# Patient Record
Sex: FEMALE | Race: WHITE | NOT HISPANIC OR LATINO | Employment: OTHER | URBAN - METROPOLITAN AREA
[De-identification: names, ages, dates, MRNs, and addresses within clinical notes are randomized per-mention and may not be internally consistent; named-entity substitution may affect disease eponyms.]

---

## 2024-01-23 PROBLEM — Z90.710 H/O: HYSTERECTOMY: Status: RESOLVED | Noted: 2024-01-23 | Resolved: 2024-01-23

## 2024-01-23 PROBLEM — N17.9 ACUTE RENAL FAILURE SYNDROME (CMS-HCC): Status: RESOLVED | Noted: 2024-01-23 | Resolved: 2024-01-23

## 2024-01-23 PROBLEM — E55.9 VITAMIN D DEFICIENCY: Status: ACTIVE | Noted: 2024-01-23

## 2024-01-23 PROBLEM — H66.002 ACUTE SUPPURATIVE OTITIS MEDIA OF LEFT EAR WITHOUT SPONTANEOUS RUPTURE OF TYMPANIC MEMBRANE: Status: RESOLVED | Noted: 2024-01-23 | Resolved: 2024-01-23

## 2024-01-23 PROBLEM — D64.9 ANEMIA: Status: ACTIVE | Noted: 2024-01-23

## 2024-01-23 PROBLEM — R19.7 DIARRHEA: Status: RESOLVED | Noted: 2024-01-23 | Resolved: 2024-01-23

## 2024-01-23 PROBLEM — R92.8 ABNORMAL MAMMOGRAM: Status: RESOLVED | Noted: 2024-01-23 | Resolved: 2024-01-23

## 2024-01-23 PROBLEM — E53.8 VITAMIN B12 DEFICIENCY: Status: ACTIVE | Noted: 2024-01-23

## 2024-01-23 PROBLEM — N18.9 CKD (CHRONIC KIDNEY DISEASE): Status: RESOLVED | Noted: 2024-01-23 | Resolved: 2024-01-23

## 2024-01-23 RX ORDER — ACETAMINOPHEN 325 MG/1
325 TABLET ORAL EVERY 6 HOURS PRN
COMMUNITY
Start: 2020-06-05

## 2024-01-23 RX ORDER — ERGOCALCIFEROL 1.25 MG/1
50000 CAPSULE ORAL
COMMUNITY
Start: 2016-12-01 | End: 2024-03-26 | Stop reason: ALTCHOICE

## 2024-01-23 RX ORDER — LOPERAMIDE HYDROCHLORIDE 2 MG/1
2 CAPSULE ORAL 4 TIMES DAILY PRN
COMMUNITY
Start: 2021-07-12

## 2024-01-23 RX ORDER — POTASSIUM CHLORIDE 20 MEQ/1
20 TABLET, EXTENDED RELEASE ORAL DAILY
COMMUNITY
Start: 2020-03-07

## 2024-01-23 RX ORDER — LANOLIN ALCOHOL/MO/W.PET/CERES
1000 CREAM (GRAM) TOPICAL DAILY
COMMUNITY
Start: 2020-09-17 | End: 2024-04-11 | Stop reason: WASHOUT

## 2024-01-23 RX ORDER — LANOLIN ALCOHOL/MO/W.PET/CERES
400 CREAM (GRAM) TOPICAL DAILY
COMMUNITY

## 2024-01-25 ENCOUNTER — APPOINTMENT (OUTPATIENT)
Dept: PRIMARY CARE | Facility: CLINIC | Age: 64
End: 2024-01-25
Payer: COMMERCIAL

## 2024-02-04 NOTE — PROGRESS NOTES
REASON FOR VISIT:  Crohn's disease    HPI:  Rissa Schmidt is a 63 y.o. female who presents for follow-up of Crohn's disease.  Last seen 11/2022.   Longstanding Crohn's disease with remote ileocolonic resection in 1995. She was previously on 6-MP but stopped in 2008. She had been on Remicade previously but had infections with this so it was stopped.      Presented acutely mid-11/2019 with acute abdomen and went to OR for ileal anastomotic obstruction ([facility]). Post-op course complicated by suspected leak, with delayed/recurring abscess formation, and need for drains.     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     3/26/2020 went back to OR for re-do ileocolic resection and loop ileostomy. Had difficulty with high output loop ileostomy, dehydration, and SIM requiring IVF.      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 Splenic artery not seen. High-grade 1 cm stenosis of the origin of the SMA. Mild stenosis of the infrarenal abdominal aorta just proximal to the bifurcation and short segment focal dissection of the right common iliac artery.     8/2021 stopped warfarin after visit with Dr. Zamudio. Recommended to be on ASA and statin.     Last seen 11/2022. Stools variable depending what eats. Stools 4-6 daily. Stool very loose (first AM stool) and then watery the rest of the day. For about 1 month noted worsened acid reflux. Also with pain in LUQ at site of stoma closure. No N/V.    11/2022 CT abd-pelvis OK without any obvious disease.  Was supposed to do FCP  "and colonoscopy, but did not.   w/ bladder CA.      In follow-up today, patient overall feels well. No abd pain, 6-10x liquid stools daily, no blood in stool, occasional urgency with BM, occasional nocturnal symptoms.  had bladder removal and now in remission, very stressful past 1.5 years and now goods. Still smoking 1/2 P daily. Patient self discontinued ASA/statin due to \"intolerance\".  In the office today, she says that she would consider advanced therapies.  Of note, when her stool gets to form, she has increased abdominal pain.  She prefers to have loose stool, though would be happy if the frequency were less.    EIMs: No joint pain/eye pain/eye redness, new skin lesion on R hand pending derm eval, no oral lesion.     REVIEW OF SYSTEMS: All 10 systems reviewed are negative unless mentioned in HPI.     Allergies   Allergen Reactions    Oxycodone Hallucinations    Latex, Natural Rubber Rash and Unknown       Past Medical History:   Diagnosis Date    Abnormal mammogram 01/23/2024    Acute renal failure syndrome (CMS/HCC) 01/23/2024    Acute suppurative otitis media of left ear without spontaneous rupture of tympanic membrane 01/23/2024    Anemia     Arthritis     Carpal tunnel syndrome     CKD (chronic kidney disease) 01/23/2024    Crohn's disease of both small and large intestine with abscess (CMS/HCC) 02/27/2021    Crohn's disease of both small and large intestine with abscess    Diarrhea 01/23/2024    Difficulty in walking     Erythema nodosum     H/O: hysterectomy 01/23/2024    Hypokalemia     Hypomagnesemia     Leukocytosis     Ophthalmic migraine     Radiographic dye allergy status     Allergic to IV contrast    Skin lesion     Status post ileostomy (CMS/HCC)     Tendonitis     Vitamin B12 deficiency     Vitamin D deficiency        Past Surgical History:   Procedure Laterality Date    APPENDECTOMY  11/10/2016    Appendectomy    CT GUIDED PERCUTANEOUS PERITONEAL OR RETROPERITONEAL FLUID " "COLLECTION DRAINAGE  12/9/2019    CT GUIDED PERCUTANEOUS PERITONEAL OR RETROPERITONEAL FLUID COLLECTION DRAINAGE [facility]    GALLBLADDER SURGERY  11/10/2016    Gallbladder Surgery    HYSTERECTOMY  11/10/2016    Hysterectomy    OTHER SURGICAL HISTORY  11/10/2016    Thoracotomy    OTHER SURGICAL HISTORY  06/17/2020    Ileostomy closure    OTHER SURGICAL HISTORY  01/02/2020    Cholecystectomy    OTHER SURGICAL HISTORY  01/02/2020    Hysterectomy    SMALL INTESTINE SURGERY  11/10/2016    Small Bowel Resection    US GUIDED PERCUTANEOUS PERITONEAL OR RETROPERITONEAL FLUID COLLECTION DRAINAGE  12/3/2019    US GUIDED PERCUTANEOUS PERITONEAL OR RETROPERITONEAL FLUID COLLECTION DRAINAGE [facility]    US GUIDED PERCUTANEOUS PERITONEAL OR RETROPERITONEAL FLUID COLLECTION DRAINAGE  12/3/2019    US GUIDED PERCUTANEOUS PERITONEAL OR RETROPERITONEAL FLUID COLLECTION DRAINAGE [facility]       Current Outpatient Medications   Medication Sig Dispense Refill    acetaminophen (Tylenol) 325 mg tablet Take 1 tablet (325 mg) by mouth every 6 hours if needed for mild pain (1 - 3) or moderate pain (4 - 6).      cyanocobalamin (Vitamin B-12) 1,000 mcg tablet Take 1 tablet (1,000 mcg) by mouth once daily.      ergocalciferol (Vitamin D2) 1.25 MG (50119 UT) capsule Take 1 capsule (50,000 Units) by mouth 1 (one) time per week.      loperamide (Imodium) 2 mg capsule Take 1 capsule (2 mg) by mouth 4 times a day as needed for diarrhea.      magnesium oxide (Mag-Ox) 400 mg (241.3 mg magnesium) tablet Take 1 tablet (400 mg) by mouth once daily.      potassium chloride CR 20 mEq ER tablet Take 1 tablet (20 mEq) by mouth once daily.       No current facility-administered medications for this visit.       PHYSICAL EXAM:  /60   Pulse 85   Temp 36.6 °C (97.9 °F)   Ht 1.676 m (5' 6\")   Wt 80.7 kg (178 lb)   SpO2 98%   BMI 28.73 kg/m²      GEN: NAD   HEENT: No Scleral icterus/oral ulcer   CV: RRR  PULM: LCTA "   ABD: Soft, NT, ND, +BS  EXT: No LE edema  SKIN: Small scaly skin lesion on R dorsal hand     Lab Results   Component Value Date    WBC 13.8 (H) 11/10/2022    HGB 13.2 11/10/2022    HCT 40.1 11/10/2022    MCV 97 11/10/2022     11/10/2022     Lab Results   Component Value Date    ALT 15 11/10/2022    AST 19 11/10/2022    GGT 58 (H) 12/08/2019    ALKPHOS 107 11/10/2022    BILITOT 0.3 11/10/2022       === 11/28/22 ===    CT ABDOMEN AND PELVIS W ORAL CONTRAST ONLY    - Impression -  1.  No acute intra-abdominal process. Anastomosis and adjacent bowel  loops appear normal and unchanged from 2021. Mild bilateral  pelvicaliectasis slightly increased from prior examination in the  presence of a distended urinary bladder.      ASSESSMENT  # Crohn's disease -overall, disease remains clinically stable.  She has not had disease reevaluation endoscopically since her surgery.  She spent the past year caring for her  who has bladder cancer, but is currently doing well.  She is interested in reevaluation and treatment if needed.    It is unclear to me whether she has any progressive disease.  Will check labs and stool calprotectin.  Will plan colonoscopy.  Based on these results, we can initiate therapy if needed.  In the past, she has gone decades between her surgeries, so she may have slowly progressive/recurrent disease.    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  I recommended that she go back on aspirin 81 mg daily which she is willing to do.  She was warned about signs of mesenteric ischemia, but fortunately at this time she has none of these.      PLAN  1) check labs  2) check stool fecal calprotectin  3) schedule colonoscopy  4) based on the results of the above testing, we will discuss medical management for any active Crohn's disease  5) call the office with any worsening symptoms; you can always contact my nurse, Diana Earl RN at [phone] with any questions  6)  I recommend you consider restarting a baby aspirin at 81 mg once daily

## 2024-02-06 ENCOUNTER — OFFICE VISIT (OUTPATIENT)
Dept: GASTROENTEROLOGY | Facility: HOSPITAL | Age: 64
End: 2024-02-06
Payer: COMMERCIAL

## 2024-02-06 VITALS
DIASTOLIC BLOOD PRESSURE: 60 MMHG | BODY MASS INDEX: 28.61 KG/M2 | HEIGHT: 66 IN | WEIGHT: 178 LBS | SYSTOLIC BLOOD PRESSURE: 116 MMHG | TEMPERATURE: 97.9 F | HEART RATE: 85 BPM | OXYGEN SATURATION: 98 %

## 2024-02-06 DIAGNOSIS — R10.30 LOWER ABDOMINAL PAIN: ICD-10-CM

## 2024-02-06 DIAGNOSIS — K50.819 CROHN'S DISEASE OF SMALL AND LARGE INTESTINES WITH COMPLICATION (MULTI): Primary | ICD-10-CM

## 2024-02-06 PROCEDURE — 99214 OFFICE O/P EST MOD 30 MIN: CPT | Performed by: INTERNAL MEDICINE

## 2024-02-06 RX ORDER — DIPHENOXYLATE HYDROCHLORIDE AND ATROPINE SULFATE 2.5; .025 MG/1; MG/1
2 TABLET ORAL 4 TIMES DAILY PRN
Qty: 56 TABLET | Refills: 0 | Status: SHIPPED | OUTPATIENT
Start: 2024-02-06 | End: 2024-02-13

## 2024-02-06 ASSESSMENT — PAIN SCALES - GENERAL: PAINLEVEL: 0-NO PAIN

## 2024-02-06 NOTE — PATIENT INSTRUCTIONS
As we discussed today, we will do some evaluation to assess your Crohn's disease activity.  If there is active disease, then we will discuss medical management to prevent disease progression.  As we reviewed today:    1) check labs  2) check stool fecal calprotectin  3) schedule colonoscopy  4) based on the results of the above testing, we will discuss medical management for any active Crohn's disease  5) call the office with any worsening symptoms; you can always contact my nurse, Diana Earl RN at [phone] with any questions  6) I recommend you consider restarting a baby aspirin at 81 mg once daily

## 2024-03-01 ENCOUNTER — LAB (OUTPATIENT)
Dept: LAB | Facility: LAB | Age: 64
End: 2024-03-01
Payer: COMMERCIAL

## 2024-03-01 DIAGNOSIS — R10.30 LOWER ABDOMINAL PAIN: ICD-10-CM

## 2024-03-01 DIAGNOSIS — K50.819 CROHN'S DISEASE OF SMALL AND LARGE INTESTINES WITH COMPLICATION (MULTI): ICD-10-CM

## 2024-03-01 LAB
ALBUMIN SERPL-MCNC: 3.8 G/DL (ref 3.5–5)
ALP BLD-CCNC: 98 U/L (ref 35–125)
ALT SERPL-CCNC: 17 U/L (ref 5–40)
ANION GAP SERPL CALC-SCNC: 13 MMOL/L
AST SERPL-CCNC: 19 U/L (ref 5–40)
BASOPHILS # BLD AUTO: 0.07 X10*3/UL (ref 0–0.1)
BASOPHILS NFR BLD AUTO: 0.5 %
BILIRUB SERPL-MCNC: 0.2 MG/DL (ref 0.1–1.2)
BUN SERPL-MCNC: 15 MG/DL (ref 8–25)
CALCIUM SERPL-MCNC: 8.9 MG/DL (ref 8.5–10.4)
CHLORIDE SERPL-SCNC: 108 MMOL/L (ref 97–107)
CO2 SERPL-SCNC: 22 MMOL/L (ref 24–31)
CREAT SERPL-MCNC: 1.1 MG/DL (ref 0.4–1.6)
CRP SERPL-MCNC: 1 MG/DL (ref 0–2)
EGFRCR SERPLBLD CKD-EPI 2021: 57 ML/MIN/1.73M*2
EOSINOPHIL # BLD AUTO: 0.41 X10*3/UL (ref 0–0.7)
EOSINOPHIL NFR BLD AUTO: 3.1 %
ERYTHROCYTE [DISTWIDTH] IN BLOOD BY AUTOMATED COUNT: 13.3 % (ref 11.5–14.5)
GLUCOSE SERPL-MCNC: 101 MG/DL (ref 65–99)
HCT VFR BLD AUTO: 35.6 % (ref 36–46)
HGB BLD-MCNC: 11.7 G/DL (ref 12–16)
IMM GRANULOCYTES # BLD AUTO: 0.05 X10*3/UL (ref 0–0.7)
IMM GRANULOCYTES NFR BLD AUTO: 0.4 % (ref 0–0.9)
LYMPHOCYTES # BLD AUTO: 4.55 X10*3/UL (ref 1.2–4.8)
LYMPHOCYTES NFR BLD AUTO: 34.5 %
MCH RBC QN AUTO: 32.7 PG (ref 26–34)
MCHC RBC AUTO-ENTMCNC: 32.9 G/DL (ref 32–36)
MCV RBC AUTO: 99 FL (ref 80–100)
MONOCYTES # BLD AUTO: 1.04 X10*3/UL (ref 0.1–1)
MONOCYTES NFR BLD AUTO: 7.9 %
NEUTROPHILS # BLD AUTO: 7.05 X10*3/UL (ref 1.2–7.7)
NEUTROPHILS NFR BLD AUTO: 53.6 %
NRBC BLD-RTO: 0 /100 WBCS (ref 0–0)
PLATELET # BLD AUTO: 351 X10*3/UL (ref 150–450)
POTASSIUM SERPL-SCNC: 3.7 MMOL/L (ref 3.4–5.1)
PROT SERPL-MCNC: 6.5 G/DL (ref 5.9–7.9)
RBC # BLD AUTO: 3.58 X10*6/UL (ref 4–5.2)
SODIUM SERPL-SCNC: 143 MMOL/L (ref 133–145)
WBC # BLD AUTO: 13.2 X10*3/UL (ref 4.4–11.3)

## 2024-03-01 PROCEDURE — 83735 ASSAY OF MAGNESIUM: CPT

## 2024-03-01 PROCEDURE — 86481 TB AG RESPONSE T-CELL SUSP: CPT

## 2024-03-01 PROCEDURE — 85025 COMPLETE CBC W/AUTO DIFF WBC: CPT

## 2024-03-01 PROCEDURE — 86140 C-REACTIVE PROTEIN: CPT

## 2024-03-01 PROCEDURE — 36415 COLL VENOUS BLD VENIPUNCTURE: CPT

## 2024-03-01 PROCEDURE — 80053 COMPREHEN METABOLIC PANEL: CPT

## 2024-03-01 PROCEDURE — 83993 ASSAY FOR CALPROTECTIN FECAL: CPT

## 2024-03-03 LAB
NIL(NEG) CONTROL SPOT COUNT: NORMAL
PANEL A SPOT COUNT: 0
PANEL B SPOT COUNT: 0
POS CONTROL SPOT COUNT: NORMAL
T-SPOT. TB INTERPRETATION: NEGATIVE

## 2024-03-05 LAB — CALPROTECTIN STL-MCNT: 54 UG/G

## 2024-03-06 DIAGNOSIS — K50.819 CROHN'S DISEASE OF SMALL AND LARGE INTESTINES WITH COMPLICATION (MULTI): Primary | ICD-10-CM

## 2024-03-06 LAB — MAGNESIUM SERPL-MCNC: 1.8 MG/DL (ref 1.6–3.1)

## 2024-03-22 PROBLEM — E53.8 COBALAMIN DEFICIENCY: Status: RESOLVED | Noted: 2024-01-23 | Resolved: 2024-03-22

## 2024-03-22 PROBLEM — E87.6 CHRONIC HYPOKALEMIA: Status: RESOLVED | Noted: 2024-03-22 | Resolved: 2024-03-22

## 2024-03-22 PROBLEM — K21.9 GASTROESOPHAGEAL REFLUX DISEASE: Status: RESOLVED | Noted: 2024-03-22 | Resolved: 2024-03-22

## 2024-03-22 PROBLEM — K63.2 COLONIC FISTULA: Status: RESOLVED | Noted: 2024-03-22 | Resolved: 2024-03-22

## 2024-03-22 PROBLEM — S69.90XA INJURY OF WRIST: Status: RESOLVED | Noted: 2024-03-22 | Resolved: 2024-03-22

## 2024-03-22 PROBLEM — N28.0 RENAL INFARCTION (MULTI): Status: RESOLVED | Noted: 2024-03-22 | Resolved: 2024-03-22

## 2024-03-22 PROBLEM — I74.9 EMBOLISM AND THROMBOSIS OF ARTERY (MULTI): Status: RESOLVED | Noted: 2024-03-22 | Resolved: 2024-03-22

## 2024-03-22 PROBLEM — E75.6: Status: RESOLVED | Noted: 2024-03-22 | Resolved: 2024-03-22

## 2024-03-22 PROBLEM — S52.509A CLOSED FRACTURE OF DISTAL END OF RADIUS: Status: RESOLVED | Noted: 2024-03-22 | Resolved: 2024-03-22

## 2024-03-22 PROBLEM — S37.009A INJURY OF KIDNEY: Status: RESOLVED | Noted: 2024-03-22 | Resolved: 2024-03-22

## 2024-03-22 PROBLEM — R10.9 LEFT FLANK PAIN: Status: RESOLVED | Noted: 2024-02-06 | Resolved: 2024-03-22

## 2024-03-22 PROBLEM — Z90.710 HISTORY OF HYSTERECTOMY: Status: RESOLVED | Noted: 2024-03-22 | Resolved: 2024-03-22

## 2024-03-26 ENCOUNTER — OFFICE VISIT (OUTPATIENT)
Dept: PRIMARY CARE | Facility: CLINIC | Age: 64
End: 2024-03-26
Payer: COMMERCIAL

## 2024-03-26 VITALS
BODY MASS INDEX: 29.25 KG/M2 | SYSTOLIC BLOOD PRESSURE: 138 MMHG | OXYGEN SATURATION: 96 % | DIASTOLIC BLOOD PRESSURE: 74 MMHG | HEIGHT: 66 IN | WEIGHT: 182 LBS | HEART RATE: 77 BPM | TEMPERATURE: 98.1 F

## 2024-03-26 DIAGNOSIS — E53.8 VITAMIN B12 DEFICIENCY: ICD-10-CM

## 2024-03-26 DIAGNOSIS — Z13.220 SCREENING FOR CHOLESTEROL LEVEL: ICD-10-CM

## 2024-03-26 DIAGNOSIS — Z12.31 SCREENING MAMMOGRAM FOR BREAST CANCER: ICD-10-CM

## 2024-03-26 DIAGNOSIS — E55.9 VITAMIN D DEFICIENCY: ICD-10-CM

## 2024-03-26 DIAGNOSIS — Z00.00 ANNUAL PHYSICAL EXAM: Primary | ICD-10-CM

## 2024-03-26 PROCEDURE — 90715 TDAP VACCINE 7 YRS/> IM: CPT | Performed by: INTERNAL MEDICINE

## 2024-03-26 PROCEDURE — 4004F PT TOBACCO SCREEN RCVD TLK: CPT | Performed by: INTERNAL MEDICINE

## 2024-03-26 PROCEDURE — 99396 PREV VISIT EST AGE 40-64: CPT | Performed by: INTERNAL MEDICINE

## 2024-03-26 RX ORDER — VIT C/E/ZN/COPPR/LUTEIN/ZEAXAN 250MG-90MG
25 CAPSULE ORAL DAILY
COMMUNITY
End: 2024-04-11 | Stop reason: WASHOUT

## 2024-03-26 RX ORDER — ASPIRIN 81 MG/1
81 TABLET ORAL DAILY
COMMUNITY

## 2024-03-26 ASSESSMENT — ENCOUNTER SYMPTOMS
LOSS OF SENSATION IN FEET: 0
OCCASIONAL FEELINGS OF UNSTEADINESS: 0
DEPRESSION: 0

## 2024-03-26 ASSESSMENT — LIFESTYLE VARIABLES
HAVE YOU OR SOMEONE ELSE BEEN INJURED AS A RESULT OF YOUR DRINKING: NO
HOW OFTEN DURING THE LAST YEAR HAVE YOU NEEDED AN ALCOHOLIC DRINK FIRST THING IN THE MORNING TO GET YOURSELF GOING AFTER A NIGHT OF HEAVY DRINKING: NEVER
HAS A RELATIVE, FRIEND, DOCTOR, OR ANOTHER HEALTH PROFESSIONAL EXPRESSED CONCERN ABOUT YOUR DRINKING OR SUGGESTED YOU CUT DOWN: NO
SKIP TO QUESTIONS 9-10: 1
AUDIT TOTAL SCORE: 0
HOW OFTEN DURING THE LAST YEAR HAVE YOU FOUND THAT YOU WERE NOT ABLE TO STOP DRINKING ONCE YOU HAD STARTED: NEVER
HOW OFTEN DURING THE LAST YEAR HAVE YOU FAILED TO DO WHAT WAS NORMALLY EXPECTED FROM YOU BECAUSE OF DRINKING: NEVER
HOW MANY STANDARD DRINKS CONTAINING ALCOHOL DO YOU HAVE ON A TYPICAL DAY: PATIENT DOES NOT DRINK
AUDIT-C TOTAL SCORE: 0
HOW OFTEN DURING THE LAST YEAR HAVE YOU HAD A FEELING OF GUILT OR REMORSE AFTER DRINKING: NEVER
HOW OFTEN DURING THE LAST YEAR HAVE YOU BEEN UNABLE TO REMEMBER WHAT HAPPENED THE NIGHT BEFORE BECAUSE YOU HAD BEEN DRINKING: NEVER
HOW OFTEN DO YOU HAVE A DRINK CONTAINING ALCOHOL: NEVER
HOW OFTEN DO YOU HAVE SIX OR MORE DRINKS ON ONE OCCASION: NEVER

## 2024-03-26 ASSESSMENT — PATIENT HEALTH QUESTIONNAIRE - PHQ9
SUM OF ALL RESPONSES TO PHQ9 QUESTIONS 1 AND 2: 0
2. FEELING DOWN, DEPRESSED OR HOPELESS: NOT AT ALL
1. LITTLE INTEREST OR PLEASURE IN DOING THINGS: NOT AT ALL

## 2024-03-26 ASSESSMENT — PAIN SCALES - GENERAL: PAINLEVEL: 0-NO PAIN

## 2024-03-26 NOTE — PROGRESS NOTES
[facility]: [facility]  PROGRESS NOTE      Rissa Schmidt is a 63 y.o. female that is being seen  today for CPE.  Subjective   Pt. Is being seen for annual physical exam.Pt. has been doing well.Advance directives discussed with patient   Denies any hospitalisation or urgent care visit.  Previous Labs reviewed .  Pt. Is upto date on screening exams and vaccination  Denies any falls or hospitalisation.     Patient is a 63-year-old female with history of Crohn's disease who is being seen for annual physical examination.  Patient follows up with the GI physician and had multiple colonoscopies and endoscopies in the past.  Patient's symptoms have been fairly controlled now.  Patient is due for screening mammogram.  Patient is up-to-date on immunization.      ROS  Negative for fever or chills  Negative for sore throat, ear pain, nasal discharge  Negative for cough, shortness of breath or wheezing  Negative for chest pain, palpitations, swelling of legs  Negative for abdominal pain, constipation, positive for diarrhea, and some blood in the stools occasionally.  Patient has Crohn's disease  Negative for urinary complaints  Negative for headache, dizziness, weakness or numbness  Negative for joint pain  Negative for depression or anxiety  All other systems reviewed and were negative   Vitals:    03/26/24 1445   BP: 138/74   Pulse: 77   Temp: 36.7 °C (98.1 °F)   SpO2: 96%      Vitals:    03/26/24 1445   Weight: 82.6 kg (182 lb)     Body mass index is 29.38 kg/m².  Physical Exam  Constitutional: Patient does not appear to be in any acute distress  Head and Face: NCAT  Eyes: Normal external exam, EOMI  ENT: Normal external inspection of ears and nose. Oropharynx normal.  Cardiovascular: RRR, S1/S2, no murmurs, rubs, or gallops, radial pulses +2, no edema of extremities  Pulmonary: CTAB, no respiratory distress.  Abdomen: +BS, soft, non-tender, nondistended, no guarding or rebound, no masses  "noted  MSK: No joint swelling, normal movements of all extremities. Range of motion- normal.  Skin- No lesions, contusions, or erythema.  Peripheral puslses palpable bilaterally 2+  Neuro: AAO X3, Cranial nerves 2-12 grossly intact,DTR 2+ in all 4 limbs   Psychiatric: Judgment intact. Appropriate mood and behavior    LABS   [unfilled]  Lab Results   Component Value Date    GLUCOSE 101 (H) 03/01/2024    CALCIUM 8.9 03/01/2024     03/01/2024    K 3.7 03/01/2024    CO2 22 (L) 03/01/2024     (H) 03/01/2024    BUN 15 03/01/2024    CREATININE 1.10 03/01/2024     Lab Results   Component Value Date    ALT 17 03/01/2024    AST 19 03/01/2024    GGT 58 (H) 12/08/2019    ALKPHOS 98 03/01/2024    BILITOT 0.2 03/01/2024     Lab Results   Component Value Date    WBC 13.2 (H) 03/01/2024    HGB 11.7 (L) 03/01/2024    HCT 35.6 (L) 03/01/2024    MCV 99 03/01/2024     03/01/2024     No results found for: \"CHOL\"  No results found for: \"HDL\"  No results found for: \"LDLCALC\"  No results found for: \"TRIG\"  Lab Results   Component Value Date    HGBA1C 5.5 11/18/2019     Other labs not included in the list above were reviewed either before or during this encounter.    History    Past Medical History:   Diagnosis Date    Abnormal deposits of lipid 03/22/2024    Abnormal mammogram 01/23/2024    Acute renal failure syndrome (CMS/HCC) 01/23/2024    Acute suppurative otitis media of left ear without spontaneous rupture of tympanic membrane 01/23/2024    Anemia     Arthritis     Carpal tunnel syndrome     Chronic hypokalemia 03/22/2024    CKD (chronic kidney disease) 01/23/2024    Closed fracture of distal end of radius 03/22/2024    Cobalamin deficiency 01/23/2024    Colonic fistula 03/22/2024    Crohn's disease of both small and large intestine with abscess (CMS/HCC) 02/27/2021    Crohn's disease of both small and large intestine with abscess    Diarrhea 01/23/2024    Difficulty in walking     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/[facility]) 03/22/2024    Erythema nodosum     Gastroesophageal reflux disease 03/22/2024    H/O: hysterectomy 01/23/2024    History of hysterectomy 03/22/2024    Hypokalemia     Hypomagnesemia     Injury of kidney 03/22/2024    Injury of wrist 03/22/2024    Left flank pain 02/06/2024    Leukocytosis     Ophthalmic migraine     Radiographic dye allergy status     Allergic to IV contrast    Renal infarction (CMS/[facility]) 03/22/2024    Skin lesion     Status post ileostomy (CMS/[facility])     Tendonitis     Vitamin B12 deficiency     Vitamin D deficiency      Past Surgical History:   Procedure Laterality Date    APPENDECTOMY  11/10/2016    Appendectomy    CT GUIDED PERCUTANEOUS PERITONEAL OR RETROPERITONEAL FLUID COLLECTION DRAINAGE  12/9/2019    CT GUIDED PERCUTANEOUS PERITONEAL OR RETROPERITONEAL FLUID COLLECTION DRAINAGE [facility]    GALLBLADDER SURGERY  11/10/2016    Gallbladder Surgery    HYSTERECTOMY  11/10/2016    Hysterectomy    OTHER SURGICAL HISTORY  11/10/2016    Thoracotomy    OTHER SURGICAL HISTORY  06/17/2020    Ileostomy closure    OTHER SURGICAL HISTORY  01/02/2020    Cholecystectomy    OTHER SURGICAL HISTORY  01/02/2020    Hysterectomy    SMALL INTESTINE SURGERY  11/10/2016    Small Bowel Resection    US GUIDED PERCUTANEOUS PERITONEAL OR RETROPERITONEAL FLUID COLLECTION DRAINAGE  12/3/2019    US GUIDED PERCUTANEOUS PERITONEAL OR RETROPERITONEAL FLUID COLLECTION DRAINAGE [facility]    US GUIDED PERCUTANEOUS PERITONEAL OR RETROPERITONEAL FLUID COLLECTION DRAINAGE  12/3/2019    US GUIDED PERCUTANEOUS PERITONEAL OR RETROPERITONEAL FLUID COLLECTION DRAINAGE [facility]     No family history on file.  Allergies   Allergen Reactions    Oxycodone Hallucinations    Latex, Natural Rubber Rash and Unknown    Other Rash     CT SCAN DYE     Current Outpatient Medications on File Prior to Visit   Medication Sig Dispense Refill    acetaminophen (Tylenol) 325 mg tablet Take 1 tablet (325 mg) by  mouth every 6 hours if needed for mild pain (1 - 3) or moderate pain (4 - 6).      aspirin 81 mg EC tablet Take 1 tablet (81 mg) by mouth once daily.      cholecalciferol (Vitamin D3) 25 MCG (1000 UT) capsule Take 1 capsule (25 mcg) by mouth once daily.      cyanocobalamin (Vitamin B-12) 1,000 mcg tablet Take 1 tablet (1,000 mcg) by mouth once daily.      loperamide (Imodium) 2 mg capsule Take 1 capsule (2 mg) by mouth 4 times a day as needed for diarrhea.      magnesium oxide (Mag-Ox) 400 mg (241.3 mg magnesium) tablet Take 1 tablet (400 mg) by mouth once daily.      potassium chloride CR 20 mEq ER tablet Take 1 tablet (20 mEq) by mouth once daily.      [DISCONTINUED] ergocalciferol (Vitamin D2) 1.25 MG (96805 UT) capsule Take 1 capsule (50,000 Units) by mouth 1 (one) time per week.       No current facility-administered medications on file prior to visit.     Immunization History   Administered Date(s) Administered    Flu vaccine (IIV4), preservative free *Check age/dose* 10/05/2020    HiB, unspecified 01/09/2020    Meningococcal MCV4P 01/09/2020    Pfizer Purple Cap SARS-CoV-2 03/05/2021, 04/02/2021, 10/03/2021    Pneumococcal polysaccharide vaccine, 23-valent, age 2 years and older (PNEUMOVAX 23) 01/10/2020    Tdap vaccine, age 7 year and older (BOOSTRIX, ADACEL) 06/20/2008, 03/26/2024     Patient's medical history was reviewed and updated either before or during this encounter.  ASSESSMENT / PLAN:  Diagnoses and all orders for this visit:  Annual physical exam  Screening mammogram for breast cancer  -     BI mammo bilateral screening tomosynthesis; Future  Screening for cholesterol level  -     Lipid Panel; Future  Vitamin B12 deficiency  -     Vitamin B12; Future  Vitamin D deficiency  -     Vitamin D 25-Hydroxy,Total (for eval of Vitamin D levels); Future  Other orders  -     Tdap vaccine, age 7 years and older  (BOOSTRIX)      Patient is being seen for annual physical examination.  Previous lab work reviewed  and have been stable.  Patient is due for screening mammogram.  Patient also is going to get a tetanus vaccine today.  Patient will follow-up in 1 year    Joesph Alvarado MD

## 2024-04-10 ENCOUNTER — LAB (OUTPATIENT)
Dept: LAB | Facility: LAB | Age: 64
End: 2024-04-10
Payer: COMMERCIAL

## 2024-04-10 ENCOUNTER — HOSPITAL ENCOUNTER (OUTPATIENT)
Dept: RADIOLOGY | Facility: CLINIC | Age: 64
Discharge: HOME | End: 2024-04-10
Payer: COMMERCIAL

## 2024-04-10 VITALS — HEIGHT: 66 IN | WEIGHT: 182 LBS | BODY MASS INDEX: 29.25 KG/M2

## 2024-04-10 DIAGNOSIS — Z12.31 SCREENING MAMMOGRAM FOR BREAST CANCER: ICD-10-CM

## 2024-04-10 DIAGNOSIS — Z13.220 SCREENING FOR CHOLESTEROL LEVEL: ICD-10-CM

## 2024-04-10 DIAGNOSIS — E55.9 VITAMIN D DEFICIENCY: ICD-10-CM

## 2024-04-10 DIAGNOSIS — E53.8 VITAMIN B12 DEFICIENCY: ICD-10-CM

## 2024-04-10 DIAGNOSIS — K50.819 CROHN'S DISEASE OF SMALL AND LARGE INTESTINES WITH COMPLICATION (MULTI): ICD-10-CM

## 2024-04-10 LAB
25(OH)D3 SERPL-MCNC: 23 NG/ML (ref 31–100)
BUN SERPL-MCNC: 14 MG/DL (ref 8–25)
CHOLEST SERPL-MCNC: 145 MG/DL (ref 133–200)
CHOLEST/HDLC SERPL: 3.2 {RATIO}
CREAT SERPL-MCNC: 1.1 MG/DL (ref 0.4–1.6)
EGFRCR SERPLBLD CKD-EPI 2021: 57 ML/MIN/1.73M*2
ERYTHROCYTE [DISTWIDTH] IN BLOOD BY AUTOMATED COUNT: 13.1 % (ref 11.5–14.5)
HCT VFR BLD AUTO: 37.1 % (ref 36–46)
HDLC SERPL-MCNC: 45 MG/DL
HGB BLD-MCNC: 11.9 G/DL (ref 12–16)
LDLC SERPL CALC-MCNC: 74 MG/DL (ref 65–130)
MCH RBC QN AUTO: 32.4 PG (ref 26–34)
MCHC RBC AUTO-ENTMCNC: 32.1 G/DL (ref 32–36)
MCV RBC AUTO: 101 FL (ref 80–100)
NRBC BLD-RTO: 0 /100 WBCS (ref 0–0)
PLATELET # BLD AUTO: 364 X10*3/UL (ref 150–450)
RBC # BLD AUTO: 3.67 X10*6/UL (ref 4–5.2)
TRIGL SERPL-MCNC: 129 MG/DL (ref 40–150)
VIT B12 SERPL-MCNC: 358 PG/ML (ref 211–946)
WBC # BLD AUTO: 13.1 X10*3/UL (ref 4.4–11.3)

## 2024-04-10 PROCEDURE — 82306 VITAMIN D 25 HYDROXY: CPT

## 2024-04-10 PROCEDURE — 84520 ASSAY OF UREA NITROGEN: CPT

## 2024-04-10 PROCEDURE — 77067 SCR MAMMO BI INCL CAD: CPT

## 2024-04-10 PROCEDURE — 82607 VITAMIN B-12: CPT

## 2024-04-10 PROCEDURE — 77067 SCR MAMMO BI INCL CAD: CPT | Performed by: RADIOLOGY

## 2024-04-10 PROCEDURE — 36415 COLL VENOUS BLD VENIPUNCTURE: CPT

## 2024-04-10 PROCEDURE — 77063 BREAST TOMOSYNTHESIS BI: CPT | Performed by: RADIOLOGY

## 2024-04-10 PROCEDURE — 85027 COMPLETE CBC AUTOMATED: CPT

## 2024-04-10 PROCEDURE — 80061 LIPID PANEL: CPT

## 2024-04-10 PROCEDURE — 82565 ASSAY OF CREATININE: CPT

## 2024-04-11 ENCOUNTER — TELEPHONE (OUTPATIENT)
Dept: PRIMARY CARE | Facility: CLINIC | Age: 64
End: 2024-04-11
Payer: COMMERCIAL

## 2024-04-11 RX ORDER — ACETAMINOPHEN 500 MG
5000 TABLET ORAL DAILY
COMMUNITY

## 2024-04-11 RX ORDER — UBIDECARENONE 75 MG
500 CAPSULE ORAL DAILY
COMMUNITY

## 2024-04-11 NOTE — TELEPHONE ENCOUNTER
Medication: furosemide (LASIX) 40 MG tablet    PCP: Charles Harvey MD  Preferred Contact Number: If questions please call James at    Mobile [phone]     Pharmacy:  [facility]     Spoke with pt about results

## 2024-04-11 NOTE — TELEPHONE ENCOUNTER
----- Message from Joesph Alvarado MD sent at 4/11/2024  1:19 PM EDT -----  Low vitamin D and low vitamin B12.PT. should take vitamin D3 5000 international units daily and vitamin b12 500 micrograms daily

## 2024-04-15 ENCOUNTER — DOCUMENTATION (OUTPATIENT)
Dept: GASTROENTEROLOGY | Facility: HOSPITAL | Age: 64
End: 2024-04-15

## 2024-04-15 ENCOUNTER — HOSPITAL ENCOUNTER (OUTPATIENT)
Dept: GASTROENTEROLOGY | Facility: HOSPITAL | Age: 64
Setting detail: OUTPATIENT SURGERY
Discharge: HOME | End: 2024-04-15
Payer: COMMERCIAL

## 2024-04-15 ENCOUNTER — TELEPHONE (OUTPATIENT)
Dept: PRIMARY CARE | Facility: CLINIC | Age: 64
End: 2024-04-15

## 2024-04-15 VITALS
SYSTOLIC BLOOD PRESSURE: 110 MMHG | HEART RATE: 75 BPM | RESPIRATION RATE: 16 BRPM | DIASTOLIC BLOOD PRESSURE: 57 MMHG | HEIGHT: 66 IN | OXYGEN SATURATION: 97 % | WEIGHT: 182 LBS | BODY MASS INDEX: 29.25 KG/M2 | TEMPERATURE: 96.8 F

## 2024-04-15 DIAGNOSIS — K50.819 CROHN'S DISEASE OF SMALL AND LARGE INTESTINES WITH COMPLICATION (MULTI): Primary | ICD-10-CM

## 2024-04-15 PROCEDURE — 2500000004 HC RX 250 GENERAL PHARMACY W/ HCPCS (ALT 636 FOR OP/ED): Performed by: INTERNAL MEDICINE

## 2024-04-15 PROCEDURE — 3700000013 HC SEDATION LEVEL 5+ TIME - EACH ADDITIONAL 15 MINUTES

## 2024-04-15 PROCEDURE — 88305 TISSUE EXAM BY PATHOLOGIST: CPT | Performed by: PATHOLOGY

## 2024-04-15 PROCEDURE — 88305 TISSUE EXAM BY PATHOLOGIST: CPT | Mod: TC,59,SUR,91 | Performed by: INTERNAL MEDICINE

## 2024-04-15 PROCEDURE — 7100000009 HC PHASE TWO TIME - INITIAL BASE CHARGE

## 2024-04-15 PROCEDURE — 45385 COLONOSCOPY W/LESION REMOVAL: CPT | Performed by: INTERNAL MEDICINE

## 2024-04-15 PROCEDURE — 7100000010 HC PHASE TWO TIME - EACH INCREMENTAL 1 MINUTE

## 2024-04-15 PROCEDURE — 99153 MOD SED SAME PHYS/QHP EA: CPT | Performed by: INTERNAL MEDICINE

## 2024-04-15 PROCEDURE — G0500 MOD SEDAT ENDO SERVICE >5YRS: HCPCS | Performed by: INTERNAL MEDICINE

## 2024-04-15 PROCEDURE — 45380 COLONOSCOPY AND BIOPSY: CPT | Performed by: INTERNAL MEDICINE

## 2024-04-15 PROCEDURE — 3700000012 HC SEDATION LEVEL 5+ TIME - INITIAL 15 MINUTES 5/> YEARS

## 2024-04-15 RX ORDER — DIPHENHYDRAMINE HYDROCHLORIDE 50 MG/ML
INJECTION INTRAMUSCULAR; INTRAVENOUS AS NEEDED
Status: COMPLETED | OUTPATIENT
Start: 2024-04-15 | End: 2024-04-15

## 2024-04-15 RX ORDER — MEPERIDINE HYDROCHLORIDE 50 MG/ML
INJECTION INTRAMUSCULAR; INTRAVENOUS; SUBCUTANEOUS AS NEEDED
Status: COMPLETED | OUTPATIENT
Start: 2024-04-15 | End: 2024-04-15

## 2024-04-15 RX ORDER — SODIUM CHLORIDE, SODIUM LACTATE, POTASSIUM CHLORIDE, CALCIUM CHLORIDE 600; 310; 30; 20 MG/100ML; MG/100ML; MG/100ML; MG/100ML
20 INJECTION, SOLUTION INTRAVENOUS CONTINUOUS
Status: DISCONTINUED | OUTPATIENT
Start: 2024-04-15 | End: 2024-04-16 | Stop reason: HOSPADM

## 2024-04-15 RX ORDER — MIDAZOLAM HYDROCHLORIDE 1 MG/ML
INJECTION, SOLUTION INTRAMUSCULAR; INTRAVENOUS AS NEEDED
Status: COMPLETED | OUTPATIENT
Start: 2024-04-15 | End: 2024-04-15

## 2024-04-15 RX ORDER — ONDANSETRON HYDROCHLORIDE 2 MG/ML
4 INJECTION, SOLUTION INTRAVENOUS ONCE AS NEEDED
Status: DISCONTINUED | OUTPATIENT
Start: 2024-04-15 | End: 2024-04-16 | Stop reason: HOSPADM

## 2024-04-15 RX ADMIN — MIDAZOLAM HYDROCHLORIDE 1 MG: 1 INJECTION, SOLUTION INTRAMUSCULAR; INTRAVENOUS at 08:09

## 2024-04-15 RX ADMIN — DIPHENHYDRAMINE HYDROCHLORIDE 50 MG: 50 INJECTION, SOLUTION INTRAMUSCULAR; INTRAVENOUS at 08:06

## 2024-04-15 RX ADMIN — MIDAZOLAM HYDROCHLORIDE 2 MG: 1 INJECTION, SOLUTION INTRAMUSCULAR; INTRAVENOUS at 08:07

## 2024-04-15 RX ADMIN — MIDAZOLAM HYDROCHLORIDE 2 MG: 1 INJECTION, SOLUTION INTRAMUSCULAR; INTRAVENOUS at 08:05

## 2024-04-15 RX ADMIN — MEPERIDINE HYDROCHLORIDE 50 MG: 50 INJECTION INTRAMUSCULAR; INTRAVENOUS; SUBCUTANEOUS at 08:05

## 2024-04-15 RX ADMIN — MEPERIDINE HYDROCHLORIDE 50 MG: 50 INJECTION INTRAMUSCULAR; INTRAVENOUS; SUBCUTANEOUS at 08:07

## 2024-04-15 RX ADMIN — SODIUM CHLORIDE 250 ML: 9 INJECTION, SOLUTION INTRAVENOUS at 08:17

## 2024-04-15 ASSESSMENT — PAIN SCALES - GENERAL

## 2024-04-15 ASSESSMENT — PAIN - FUNCTIONAL ASSESSMENT

## 2024-04-15 ASSESSMENT — COLUMBIA-SUICIDE SEVERITY RATING SCALE - C-SSRS
1. IN THE PAST MONTH, HAVE YOU WISHED YOU WERE DEAD OR WISHED YOU COULD GO TO SLEEP AND NOT WAKE UP?: NO
2. HAVE YOU ACTUALLY HAD ANY THOUGHTS OF KILLING YOURSELF?: NO
6. HAVE YOU EVER DONE ANYTHING, STARTED TO DO ANYTHING, OR PREPARED TO DO ANYTHING TO END YOUR LIFE?: NO

## 2024-04-15 NOTE — TELEPHONE ENCOUNTER
----- Message from Joesph Alvarado MD sent at 4/15/2024  8:42 AM EDT -----  Pt. Has been recommended to get US of breast

## 2024-04-15 NOTE — H&P
History Of Present Illness  Rissa Schmidt is a 63 y.o. female presenting with Crohn's disease s/p IC resection, last in 3/2020.  Not on therapy.  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/22/2024    Crohn's disease of both small and large intestine with abscess (Multi) 02/27/2021    Crohn's disease of both small and large intestine with abscess    Diarrhea 01/23/2024    Difficulty in walking     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 nodosum     Gastroesophageal reflux disease 03/22/2024    H/O: hysterectomy 01/23/2024    History of hysterectomy 03/22/2024    Hypokalemia     Hypomagnesemia     Injury of kidney 03/22/2024    Injury of wrist 03/22/2024    Left flank pain 02/06/2024    Leukocytosis     Ophthalmic migraine     Radiographic dye allergy status     Allergic to IV contrast    Renal infarction (Multi) 03/22/2024    Skin lesion     Status post ileostomy (Multi)     Tendonitis     Vitamin B12 deficiency     Vitamin D deficiency      Surgical History  Past Surgical History:   Procedure Laterality Date    APPENDECTOMY  11/10/2016    Appendectomy    BREAST BIOPSY Left     4+ yrs ago    CT GUIDED PERCUTANEOUS PERITONEAL OR RETROPERITONEAL FLUID COLLECTION DRAINAGE  12/09/2019    CT GUIDED PERCUTANEOUS PERITONEAL OR RETROPERITONEAL FLUID COLLECTION DRAINAGE [facility]    GALLBLADDER SURGERY  11/10/2016    Gallbladder Surgery    HYSTERECTOMY  11/10/2016     "Hysterectomy    OTHER SURGICAL HISTORY  11/10/2016    Thoracotomy    OTHER SURGICAL HISTORY  06/17/2020    Ileostomy closure    OTHER SURGICAL HISTORY  01/02/2020    Cholecystectomy    OTHER SURGICAL HISTORY  01/02/2020    Hysterectomy    SMALL INTESTINE SURGERY  11/10/2016    Small Bowel Resection    US GUIDED PERCUTANEOUS PERITONEAL OR RETROPERITONEAL FLUID COLLECTION DRAINAGE  12/03/2019    US GUIDED PERCUTANEOUS PERITONEAL OR RETROPERITONEAL FLUID COLLECTION DRAINAGE [facility] INPATIENT [facility]GACY    US GUIDED PERCUTANEOUS PERITONEAL OR RETROPERITONEAL FLUID COLLECTION DRAINAGE  12/03/2019    US GUIDED PERCUTANEOUS PERITONEAL OR RETROPERITONEAL FLUID COLLECTION DRAINAGE [facility]     Social History  She reports that she has been smoking cigarettes. She has never used smokeless tobacco. She reports that she does not drink alcohol and does not use drugs.    Family History  No family history on file.     Allergies  Allergies   Allergen Reactions    Oxycodone Hallucinations    Latex, Natural Rubber Rash and Unknown    Other Rash     CT SCAN DYE     Review of Systems  Pre-sedation Evaluation:  ASA Classification - ASA 2 - Patient with mild systemic disease with no functional limitations  Mallampati Score - II (hard and soft palate, upper portion of tonsils anduvula visible)    Physical Exam   Vital signs reviewed  Patient alert and oriented in no acute distress  Anicteric  Cardiac exam regular rate and rhythm S1-S2 without murmurs gallops or rubs  Lungs clear to auscultation bilaterally  Abdomen soft and nontender     Last Recorded Vitals  Blood pressure 127/59, pulse 79, temperature 36 °C (96.8 °F), temperature source Tympanic, resp. rate 16, height 1.676 m (5' 6\"), weight 82.6 kg (182 lb), SpO2 98%.     Assessment/Plan   Colonoscopy to evaluate Crohn's disease     PTA/Current Medications:  (Not in a hospital admission)    Current Outpatient Medications   Medication Sig Dispense Refill    acetaminophen (Tylenol) " 325 mg tablet Take 1 tablet (325 mg) by mouth every 6 hours if needed for mild pain (1 - 3) or moderate pain (4 - 6).      aspirin 81 mg EC tablet Take 1 tablet (81 mg) by mouth once daily.      cholecalciferol (Vitamin D3) 5,000 Units tablet Take 1 tablet (5,000 Units) by mouth once daily.      cyanocobalamin (Vitamin B-12) 500 mcg tablet Take 1 tablet (500 mcg) by mouth once daily.      loperamide (Imodium) 2 mg capsule Take 1 capsule (2 mg) by mouth 4 times a day as needed for diarrhea.      potassium chloride CR 20 mEq ER tablet Take 1 tablet (20 mEq) by mouth once daily.      magnesium oxide (Mag-Ox) 400 mg (241.3 mg magnesium) tablet Take 1 tablet (400 mg) by mouth once daily.       Current Facility-Administered Medications   Medication Dose Route Frequency Provider Last Rate Last Admin    lactated Ringer's infusion  20 mL/hr intravenous Continuous MD Levy Rowan MD

## 2024-04-15 NOTE — PROGRESS NOTES
Colonoscopy today shows mild recurrent west-terminal ileal disease with erosions scattered in TI.  SES-CD=3 (all ileal; Rutgeerts i2).  Otherwise, colonoscopy normal except for two small sigmnoid polyps removed with a cold snare.   Will await biopsies from TI and then discuss possible therapy.  May start with budesonide and then move to Entyvio  vs Stelara/Skyrizi.

## 2024-04-18 LAB
LABORATORY COMMENT REPORT: NORMAL
PATH REPORT.FINAL DX SPEC: NORMAL
PATH REPORT.GROSS SPEC: NORMAL
PATH REPORT.TOTAL CANCER: NORMAL

## 2024-04-19 ENCOUNTER — TELEPHONE (OUTPATIENT)
Dept: GASTROENTEROLOGY | Facility: HOSPITAL | Age: 64
End: 2024-04-19
Payer: COMMERCIAL

## 2024-04-19 NOTE — TELEPHONE ENCOUNTER
Spoke with patient and patholgoy results reviewed.  Although I though that there might be some mild ileal inflammation, biopsies of the west-terminal ileum returned as normal.  She is now neqarly 4 years from surgery and loop ileostomy closure, on no meds, with normal labs, fecal calprotectin of just 54, stable symptoms, minimal endoscopic disease, and no histologic disease.  At worst the endoscopic disease 4 years out was the equivalent of Rutgeets i2 disease.      I recommend watchful waiting.  She will follow-up in one year and we will repeat a fecal calprotectin.  If calprotectin going up, then will probably repeat colonoscopy and if advanceing disease, start therapy.  If calprotectin stable, then continue annual follow-up and repeat colonoscopy in 3 years.

## 2024-04-29 ENCOUNTER — APPOINTMENT (OUTPATIENT)
Dept: RADIOLOGY | Facility: CLINIC | Age: 64
End: 2024-04-29
Payer: COMMERCIAL

## 2024-04-30 ENCOUNTER — TELEPHONE (OUTPATIENT)
Dept: PRIMARY CARE | Facility: CLINIC | Age: 64
End: 2024-04-30
Payer: COMMERCIAL

## 2024-04-30 ENCOUNTER — HOSPITAL ENCOUNTER (OUTPATIENT)
Dept: RADIOLOGY | Facility: CLINIC | Age: 64
Discharge: HOME | End: 2024-04-30
Payer: COMMERCIAL

## 2024-04-30 DIAGNOSIS — R92.8 OTHER ABNORMAL AND INCONCLUSIVE FINDINGS ON DIAGNOSTIC IMAGING OF BREAST: ICD-10-CM

## 2024-04-30 PROCEDURE — 76642 ULTRASOUND BREAST LIMITED: CPT | Mod: BILATERAL PROCEDURE | Performed by: STUDENT IN AN ORGANIZED HEALTH CARE EDUCATION/TRAINING PROGRAM

## 2024-04-30 PROCEDURE — 76982 USE 1ST TARGET LESION: CPT | Mod: 50,RT

## 2024-04-30 PROCEDURE — 76642 ULTRASOUND BREAST LIMITED: CPT | Mod: 50,59

## 2024-04-30 NOTE — TELEPHONE ENCOUNTER
----- Message from Joesph Alvarado MD sent at 4/30/2024  1:12 PM EDT -----  Negative for malignancy

## 2025-04-01 ENCOUNTER — OFFICE VISIT (OUTPATIENT)
Dept: PRIMARY CARE | Facility: CLINIC | Age: 65
End: 2025-04-01
Payer: COMMERCIAL

## 2025-04-01 VITALS
HEART RATE: 74 BPM | HEIGHT: 66 IN | BODY MASS INDEX: 28.93 KG/M2 | SYSTOLIC BLOOD PRESSURE: 130 MMHG | DIASTOLIC BLOOD PRESSURE: 82 MMHG | TEMPERATURE: 97.6 F | WEIGHT: 180 LBS | OXYGEN SATURATION: 98 %

## 2025-04-01 DIAGNOSIS — Z12.31 SCREENING MAMMOGRAM FOR BREAST CANCER: ICD-10-CM

## 2025-04-01 DIAGNOSIS — D64.9 ANEMIA, UNSPECIFIED TYPE: ICD-10-CM

## 2025-04-01 DIAGNOSIS — Z13.220 SCREENING FOR CHOLESTEROL LEVEL: ICD-10-CM

## 2025-04-01 DIAGNOSIS — E53.8 VITAMIN B12 DEFICIENCY: ICD-10-CM

## 2025-04-01 DIAGNOSIS — M81.0 AGE-RELATED OSTEOPOROSIS WITHOUT CURRENT PATHOLOGICAL FRACTURE: ICD-10-CM

## 2025-04-01 DIAGNOSIS — E55.9 VITAMIN D DEFICIENCY: ICD-10-CM

## 2025-04-01 DIAGNOSIS — Z00.00 ROUTINE GENERAL MEDICAL EXAMINATION AT HEALTH CARE FACILITY: Primary | ICD-10-CM

## 2025-04-01 PROCEDURE — G0439 PPPS, SUBSEQ VISIT: HCPCS | Performed by: INTERNAL MEDICINE

## 2025-04-01 PROCEDURE — 99215 OFFICE O/P EST HI 40 MIN: CPT | Performed by: INTERNAL MEDICINE

## 2025-04-01 PROCEDURE — 3008F BODY MASS INDEX DOCD: CPT | Performed by: INTERNAL MEDICINE

## 2025-04-01 RX ORDER — DIPHENOXYLATE HYDROCHLORIDE AND ATROPINE SULFATE 2.5; .025 MG/1; MG/1
1 TABLET ORAL 4 TIMES DAILY PRN
COMMUNITY

## 2025-04-01 ASSESSMENT — ENCOUNTER SYMPTOMS
DEPRESSION: 0
LOSS OF SENSATION IN FEET: 0
OCCASIONAL FEELINGS OF UNSTEADINESS: 0

## 2025-04-01 ASSESSMENT — PAIN SCALES - GENERAL: PAINLEVEL_OUTOF10: 0-NO PAIN

## 2025-04-01 ASSESSMENT — PATIENT HEALTH QUESTIONNAIRE - PHQ9
1. LITTLE INTEREST OR PLEASURE IN DOING THINGS: NOT AT ALL
SUM OF ALL RESPONSES TO PHQ9 QUESTIONS 1 AND 2: 0
2. FEELING DOWN, DEPRESSED OR HOPELESS: NOT AT ALL

## 2025-04-01 NOTE — PROGRESS NOTES
[facility]: [facility]  PROGRESS NOTE      Rissa Schmidt is a 64 y.o. female that is being seen  today for Annual Exam.  Subjective   Pt. Is being seen for annual physical exam.Pt. has been doing well.Advance directives discussed with patient .  Patient is full code and she makes her own medical decisions.  Denies any hospitalisation or urgent care visit.  Previous Labs reviewed .  Pt. Is upto date on screening exams and vaccination  Denies any falls or hospitalisation.     Patient is a 64-year-old female with a history of from Crohn's disease, vitamin D deficiency, chronic smoker who is being seen for annual physical examination.  Patient is due for blood work to be done.  Patient has Crohn's disease and follows up with the GI.  Patient does have history of vitamin D deficiency but patient is not taking any vitamin D supplement.  Patient is due for screening mammogram as well as bone density.  Up-to-date on immunizations.      ROS  Negative for fever or chills  Negative for sore throat, ear pain, nasal discharge  Negative for cough, shortness of breath or wheezing  Negative for chest pain, palpitations, swelling of legs  Negative for abdominal pain, constipation, positive for chronic diarrhea, negative for blood in the stools  Negative for urinary complaints  Negative for headache, dizziness, weakness or numbness  Negative for joint pain  Negative for depression or anxiety  All other systems reviewed and were negative   Vitals:    04/01/25 1459   BP: 130/82   Pulse: 74   Temp: 36.4 °C (97.6 °F)   SpO2: 98%      Vitals:    04/01/25 1459   Weight: 81.6 kg (180 lb)     Body mass index is 29.05 kg/m².  Physical Exam  Constitutional: Patient does not appear to be in any acute distress  Head and Face: NCAT  Eyes: Normal external exam, EOMI  ENT: Normal external inspection of ears and nose. Oropharynx normal.  Cardiovascular: RRR, S1/S2, no murmurs, rubs, or gallops, radial pulses +2, no  edema of extremities  Pulmonary: CTAB, no respiratory distress.  Abdomen: +BS, soft, non-tender, nondistended, no guarding or rebound, no masses noted  MSK: No joint swelling, normal movements of all extremities. Range of motion- normal.  Skin- No lesions, contusions, or erythema.  Peripheral puslses palpable bilaterally 2+  Neuro: AAO X3, Cranial nerves 2-12 grossly intact,DTR 2+ in all 4 limbs   Psychiatric: Judgment intact. Appropriate mood and behavior    LABS   [unfilled]  Lab Results   Component Value Date    GLUCOSE 101 (H) 03/01/2024    CALCIUM 8.9 03/01/2024     03/01/2024    K 3.7 03/01/2024    CO2 22 (L) 03/01/2024     (H) 03/01/2024    BUN 14 04/10/2024    CREATININE 1.10 04/10/2024     Lab Results   Component Value Date    ALT 17 03/01/2024    AST 19 03/01/2024    GGT 58 (H) 12/08/2019    ALKPHOS 98 03/01/2024    BILITOT 0.2 03/01/2024     Lab Results   Component Value Date    WBC 13.1 (H) 04/10/2024    HGB 11.9 (L) 04/10/2024    HCT 37.1 04/10/2024     (H) 04/10/2024     04/10/2024     Lab Results   Component Value Date    CHOL 145 04/10/2024     Lab Results   Component Value Date    HDL 45.0 (L) 04/10/2024     Lab Results   Component Value Date    LDLCALC 74 04/10/2024     Lab Results   Component Value Date    TRIG 129 04/10/2024     Lab Results   Component Value Date    HGBA1C 5.5 11/18/2019     Other labs not included in the list above were reviewed either before or during this encounter.    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    Crohn's disease of both small and large intestine with abscess (Multi) 02/27/2021    Crohn's disease of both small and large intestine with abscess    Diarrhea 01/23/2024    Difficulty in walking     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 nodosum     Gastroesophageal reflux disease 03/22/2024    H/O: hysterectomy 01/23/2024    History of hysterectomy 03/22/2024    Hypokalemia     Hypomagnesemia     Injury of kidney 03/22/2024    Injury of wrist 03/22/2024    Left flank pain 02/06/2024    Leukocytosis     Ophthalmic migraine     Radiographic dye allergy status     Allergic to IV contrast    Renal infarction (Multi) 03/22/2024    Skin lesion     Status post ileostomy (Multi)     Tendonitis     Vitamin B12 deficiency     Vitamin D deficiency      Past Surgical History:   Procedure Laterality Date    APPENDECTOMY  11/10/2016    Appendectomy    BREAST BIOPSY Left     4+ yrs ago    CT GUIDED PERCUTANEOUS PERITONEAL OR RETROPERITONEAL FLUID COLLECTION DRAINAGE  12/09/2019    CT GUIDED PERCUTANEOUS PERITONEAL OR RETROPERITONEAL FLUID COLLECTION DRAINAGE [facility]    GALLBLADDER SURGERY  11/10/2016    Gallbladder Surgery    HYSTERECTOMY  11/10/2016    Hysterectomy    OTHER SURGICAL HISTORY  11/10/2016    Thoracotomy    OTHER SURGICAL HISTORY  06/17/2020    Ileostomy closure    OTHER SURGICAL HISTORY  01/02/2020    Cholecystectomy    OTHER SURGICAL HISTORY  01/02/2020    Hysterectomy    SMALL INTESTINE SURGERY  11/10/2016    Small Bowel Resection    US GUIDED PERCUTANEOUS PERITONEAL OR RETROPERITONEAL FLUID COLLECTION DRAINAGE  12/03/2019    US GUIDED PERCUTANEOUS PERITONEAL OR RETROPERITONEAL FLUID COLLECTION DRAINAGE [facility]    US GUIDED PERCUTANEOUS PERITONEAL OR RETROPERITONEAL FLUID COLLECTION DRAINAGE  12/03/2019    US GUIDED PERCUTANEOUS PERITONEAL OR RETROPERITONEAL FLUID COLLECTION DRAINAGE [facility]     No family history on  file.  Allergies   Allergen Reactions    Iodinated Contrast Media Other    Oxycodone Hallucinations    Latex, Natural Rubber Rash and Unknown     Current Outpatient Medications on File Prior to Visit   Medication Sig Dispense Refill    acetaminophen (Tylenol) 325 mg tablet Take 1 tablet (325 mg) by mouth every 6 hours if needed for mild pain (1 - 3) or moderate pain (4 - 6).      diphenoxylate-atropine (Lomotil) 2.5-0.025 mg tablet Take 1 tablet by mouth 4 times a day as needed for diarrhea.      [DISCONTINUED] aspirin 81 mg EC tablet Take 1 tablet (81 mg) by mouth once daily. (Patient not taking: Reported on 4/1/2025)      [DISCONTINUED] cholecalciferol (Vitamin D3) 5,000 Units tablet Take 1 tablet (5,000 Units) by mouth once daily. (Patient not taking: Reported on 4/1/2025)      [DISCONTINUED] cyanocobalamin (Vitamin B-12) 500 mcg tablet Take 1 tablet (500 mcg) by mouth once daily. (Patient not taking: Reported on 4/1/2025)      [DISCONTINUED] loperamide (Imodium) 2 mg capsule Take 1 capsule (2 mg) by mouth 4 times a day as needed for diarrhea. (Patient not taking: Reported on 4/1/2025)      [DISCONTINUED] magnesium oxide (Mag-Ox) 400 mg (241.3 mg magnesium) tablet Take 1 tablet (400 mg) by mouth once daily. (Patient not taking: Reported on 4/1/2025)      [DISCONTINUED] potassium chloride CR 20 mEq ER tablet Take 1 tablet (20 mEq) by mouth once daily. (Patient not taking: Reported on 4/1/2025)       No current facility-administered medications on file prior to visit.     Immunization History   Administered Date(s) Administered    COVID-19, mRNA, LNP-S, PF, 30 mcg/0.3 mL dose 10/03/2021    Flu vaccine (IIV4), preservative free *Check age/dose* 01/10/2020, 10/05/2020    HiB, unspecified 01/09/2020    Meningococcal ACWY-D (Menactra) 4-valent conjugate vaccine 01/09/2020    Pfizer Purple Cap SARS-CoV-2 03/05/2021, 04/02/2021    Pneumococcal polysaccharide vaccine, 23-valent, age 2 years and older (PNEUMOVAX 23)  01/10/2020    Tdap vaccine, age 7 year and older (BOOSTRIX, ADACEL) 06/20/2008, 03/26/2024     Patient's medical history was reviewed and updated either before or during this encounter.  ASSESSMENT / PLAN:  Diagnoses and all orders for this visit:  Routine general medical examination at health care facility  Screening mammogram for breast cancer  -     BI mammo bilateral screening tomosynthesis; Future  Age-related osteoporosis without current pathological fracture  -     Comprehensive Metabolic Panel; Future  -     XR DEXA bone density; Future  Vitamin D deficiency  -     Vitamin D 25-Hydroxy,Total (for eval of Vitamin D levels); Future  Vitamin B12 deficiency  -     Vitamin B12; Future  Screening for cholesterol level  -     Lipid Panel; Future  Anemia, unspecified type  -     CBC; Future    Patient is being seen for annual physical examination.  Patient has Crohn's disease and has diarrhea and follows up with the GI physician.  Patient has been taking Lomotil.  Patient has history of vitamin D deficiency but patient has not been taking any vitamin D supplementation.  Patient is due for repeat lab work to be done.  Patient is due for screening mammogram as well as for bone density.    Joesph Alvarado MD

## 2025-04-16 ENCOUNTER — TELEPHONE (OUTPATIENT)
Dept: PRIMARY CARE | Facility: CLINIC | Age: 65
End: 2025-04-16
Payer: COMMERCIAL

## 2025-04-16 ENCOUNTER — HOSPITAL ENCOUNTER (OUTPATIENT)
Dept: RADIOLOGY | Facility: CLINIC | Age: 65
Discharge: HOME | End: 2025-04-16
Payer: COMMERCIAL

## 2025-04-16 VITALS — BODY MASS INDEX: 29.05 KG/M2 | WEIGHT: 180 LBS

## 2025-04-16 DIAGNOSIS — M81.0 AGE-RELATED OSTEOPOROSIS WITHOUT CURRENT PATHOLOGICAL FRACTURE: ICD-10-CM

## 2025-04-16 DIAGNOSIS — Z12.31 SCREENING MAMMOGRAM FOR BREAST CANCER: ICD-10-CM

## 2025-04-16 PROCEDURE — 77067 SCR MAMMO BI INCL CAD: CPT

## 2025-04-16 PROCEDURE — 77063 BREAST TOMOSYNTHESIS BI: CPT | Performed by: STUDENT IN AN ORGANIZED HEALTH CARE EDUCATION/TRAINING PROGRAM

## 2025-04-16 PROCEDURE — 77067 SCR MAMMO BI INCL CAD: CPT | Performed by: STUDENT IN AN ORGANIZED HEALTH CARE EDUCATION/TRAINING PROGRAM

## 2025-04-16 PROCEDURE — 77080 DXA BONE DENSITY AXIAL: CPT | Performed by: RADIOLOGY

## 2025-04-16 PROCEDURE — 77080 DXA BONE DENSITY AXIAL: CPT

## 2025-04-16 RX ORDER — ACETAMINOPHEN 500 MG
2000 TABLET ORAL DAILY
COMMUNITY

## 2025-04-16 NOTE — TELEPHONE ENCOUNTER
----- Message from Joesph Alvarado sent at 4/16/2025  2:30 PM EDT -----    Negative for osteoporosis.  Patient has osteopenia.  Should continue with calcium supplementation. 500 mg daily      ----- Message -----  From: Interface, Radiology Results In  Sent: 4/16/2025  10:50 AM EDT  To: Joesph Alvarado MD     [History reviewed] : History reviewed. [Medications and Allergies reviewed] : Medications and allergies reviewed.

## 2025-04-16 NOTE — TELEPHONE ENCOUNTER
----- Message from Joesph Alvarado sent at 4/16/2025  2:30 PM EDT -----    Negative for osteoporosis.  Patient has osteopenia.  Should continue with calcium supplementation. 500 mg daily      ----- Message -----  From: Interface, Radiology Results In  Sent: 4/16/2025  10:50 AM EDT  To: Joesph Alvarado MD

## 2025-04-17 ENCOUNTER — TELEPHONE (OUTPATIENT)
Dept: PRIMARY CARE | Facility: CLINIC | Age: 65
End: 2025-04-17
Payer: COMMERCIAL

## 2025-04-24 ENCOUNTER — TELEPHONE (OUTPATIENT)
Dept: PRIMARY CARE | Facility: CLINIC | Age: 65
End: 2025-04-24
Payer: COMMERCIAL

## 2025-04-24 ENCOUNTER — HOSPITAL ENCOUNTER (OUTPATIENT)
Dept: RADIOLOGY | Facility: CLINIC | Age: 65
Discharge: HOME | End: 2025-04-24
Payer: COMMERCIAL

## 2025-04-24 DIAGNOSIS — R92.8 OTHER ABNORMAL AND INCONCLUSIVE FINDINGS ON DIAGNOSTIC IMAGING OF BREAST: ICD-10-CM

## 2025-04-24 PROCEDURE — 77061 BREAST TOMOSYNTHESIS UNI: CPT | Mod: LT

## 2025-04-24 PROCEDURE — 76642 ULTRASOUND BREAST LIMITED: CPT | Mod: LT

## 2025-04-24 PROCEDURE — 76982 USE 1ST TARGET LESION: CPT

## 2025-04-24 NOTE — TELEPHONE ENCOUNTER
----- Message from Joesph Alvarado sent at 4/24/2025  2:16 PM EDT -----  Ultrasound shows benign cysts.  Patient will continue to have yearly mammogram  ----- Message -----  From: Aly, Radiology Results In  Sent: 4/24/2025  11:53 AM EDT  To: Joesph Alvarado MD